# Patient Record
Sex: FEMALE | Race: WHITE | NOT HISPANIC OR LATINO | Employment: FULL TIME | URBAN - METROPOLITAN AREA
[De-identification: names, ages, dates, MRNs, and addresses within clinical notes are randomized per-mention and may not be internally consistent; named-entity substitution may affect disease eponyms.]

---

## 2019-03-07 ENCOUNTER — OFFICE VISIT (OUTPATIENT)
Dept: RHEUMATOLOGY | Facility: CLINIC | Age: 59
End: 2019-03-07
Payer: COMMERCIAL

## 2019-03-07 VITALS
WEIGHT: 293 LBS | BODY MASS INDEX: 45.99 KG/M2 | DIASTOLIC BLOOD PRESSURE: 70 MMHG | HEIGHT: 67 IN | TEMPERATURE: 99 F | HEART RATE: 60 BPM | SYSTOLIC BLOOD PRESSURE: 120 MMHG | RESPIRATION RATE: 16 BRPM

## 2019-03-07 DIAGNOSIS — M54.16 LUMBAR RADICULOPATHY: ICD-10-CM

## 2019-03-07 DIAGNOSIS — M79.7 FIBROMYOSITIS: Primary | ICD-10-CM

## 2019-03-07 DIAGNOSIS — M19.041 PRIMARY OSTEOARTHRITIS OF BOTH HANDS: ICD-10-CM

## 2019-03-07 DIAGNOSIS — M19.042 PRIMARY OSTEOARTHRITIS OF BOTH HANDS: ICD-10-CM

## 2019-03-07 DIAGNOSIS — M70.61 TROCHANTERIC BURSITIS OF RIGHT HIP: ICD-10-CM

## 2019-03-07 DIAGNOSIS — M47.817 SPONDYLOSIS OF LUMBOSACRAL REGION WITHOUT MYELOPATHY OR RADICULOPATHY: ICD-10-CM

## 2019-03-07 DIAGNOSIS — M54.12 CERVICAL RADICULOPATHY: ICD-10-CM

## 2019-03-07 DIAGNOSIS — M75.52 BURSITIS OF LEFT SHOULDER: ICD-10-CM

## 2019-03-07 PROBLEM — M75.50 BURSITIS OF SHOULDER: Status: ACTIVE | Noted: 2019-03-07

## 2019-03-07 PROBLEM — M47.812 CERVICAL SPONDYLOSIS: Status: ACTIVE | Noted: 2019-03-07

## 2019-03-07 PROBLEM — M54.50 CHRONIC LOW BACK PAIN: Status: ACTIVE | Noted: 2019-03-07

## 2019-03-07 PROBLEM — M70.60 TROCHANTERIC BURSITIS: Status: ACTIVE | Noted: 2018-09-07

## 2019-03-07 PROBLEM — M19.049 DEGENERATIVE JOINT DISEASE OF HAND: Status: ACTIVE | Noted: 2019-03-07

## 2019-03-07 PROBLEM — E79.0 HYPERURICEMIA: Status: ACTIVE | Noted: 2019-03-07

## 2019-03-07 PROBLEM — G89.29 CHRONIC LOW BACK PAIN: Status: ACTIVE | Noted: 2019-03-07

## 2019-03-07 PROBLEM — M46.90 SPONDYLITIS: Status: ACTIVE | Noted: 2019-03-07

## 2019-03-07 PROCEDURE — 20610 DRAIN/INJ JOINT/BURSA W/O US: CPT | Mod: LT,S$GLB,, | Performed by: INTERNAL MEDICINE

## 2019-03-07 PROCEDURE — 99214 OFFICE O/P EST MOD 30 MIN: CPT | Mod: 25,S$GLB,, | Performed by: INTERNAL MEDICINE

## 2019-03-07 PROCEDURE — 99214 PR OFFICE/OUTPT VISIT, EST, LEVL IV, 30-39 MIN: ICD-10-PCS | Mod: 25,S$GLB,, | Performed by: INTERNAL MEDICINE

## 2019-03-07 PROCEDURE — 20610 PR DRAIN/INJECT LARGE JOINT/BURSA: ICD-10-PCS | Mod: LT,S$GLB,, | Performed by: INTERNAL MEDICINE

## 2019-03-07 PROCEDURE — 3008F BODY MASS INDEX DOCD: CPT | Mod: CPTII,S$GLB,, | Performed by: INTERNAL MEDICINE

## 2019-03-07 PROCEDURE — 3008F PR BODY MASS INDEX (BMI) DOCUMENTED: ICD-10-PCS | Mod: CPTII,S$GLB,, | Performed by: INTERNAL MEDICINE

## 2019-03-07 RX ORDER — DICLOFENAC SODIUM 10 MG/G
GEL TOPICAL
COMMUNITY

## 2019-03-07 RX ORDER — ESCITALOPRAM OXALATE 20 MG/1
TABLET ORAL
COMMUNITY

## 2019-03-07 RX ORDER — CYCLOBENZAPRINE HCL 10 MG
TABLET ORAL
COMMUNITY
End: 2019-05-07 | Stop reason: SDUPTHER

## 2019-03-07 RX ORDER — TOLTERODINE 4 MG/1
CAPSULE, EXTENDED RELEASE ORAL
COMMUNITY

## 2019-03-07 RX ORDER — TRAMADOL HYDROCHLORIDE AND ACETAMINOPHEN 37.5; 325 MG/1; MG/1
TABLET, FILM COATED ORAL
Refills: 3 | COMMUNITY
Start: 2019-01-28 | End: 2020-03-09 | Stop reason: SDUPTHER

## 2019-03-07 RX ORDER — METHYLPREDNISOLONE ACETATE 40 MG/ML
80 INJECTION, SUSPENSION INTRA-ARTICULAR; INTRALESIONAL; INTRAMUSCULAR; SOFT TISSUE
Status: COMPLETED | OUTPATIENT
Start: 2019-03-07 | End: 2019-03-07

## 2019-03-07 RX ORDER — ESOMEPRAZOLE MAGNESIUM 40 MG/1
CAPSULE, DELAYED RELEASE ORAL
COMMUNITY
End: 2020-01-16

## 2019-03-07 RX ORDER — GABAPENTIN 300 MG/1
CAPSULE ORAL
Refills: 3 | COMMUNITY
Start: 2018-12-03 | End: 2019-10-12 | Stop reason: SDUPTHER

## 2019-03-07 RX ORDER — SALSALATE 750 MG
TABLET ORAL
COMMUNITY
End: 2019-12-01 | Stop reason: SDUPTHER

## 2019-03-07 RX ORDER — METOPROLOL SUCCINATE 50 MG/1
TABLET, EXTENDED RELEASE ORAL
COMMUNITY

## 2019-03-07 RX ORDER — LEVOTHYROXINE SODIUM 200 UG/1
TABLET ORAL
COMMUNITY

## 2019-03-07 RX ORDER — FLUTICASONE PROPIONATE 50 MCG
SPRAY, SUSPENSION (ML) NASAL
COMMUNITY

## 2019-03-07 RX ORDER — TRIAMTERENE/HYDROCHLOROTHIAZID 37.5-25 MG
TABLET ORAL
Refills: 0 | COMMUNITY
Start: 2019-02-12

## 2019-03-07 RX ORDER — ALBUTEROL SULFATE 90 UG/1
AEROSOL, METERED RESPIRATORY (INHALATION)
Refills: 6 | COMMUNITY
Start: 2018-12-19

## 2019-03-07 RX ORDER — GLYBURIDE-METFORMIN HYDROCHLORIDE 5; 500 MG/1; MG/1
TABLET ORAL
Refills: 3 | COMMUNITY
Start: 2018-12-02

## 2019-03-07 RX ORDER — ALPRAZOLAM 0.5 MG/1
TABLET ORAL
Refills: 3 | COMMUNITY
Start: 2019-02-28

## 2019-03-07 RX ORDER — FENOFIBRIC ACID 135 MG/1
CAPSULE, DELAYED RELEASE ORAL
COMMUNITY

## 2019-03-07 RX ORDER — CETIRIZINE HYDROCHLORIDE 10 MG/1
TABLET ORAL
COMMUNITY

## 2019-03-07 RX ADMIN — METHYLPREDNISOLONE ACETATE 80 MG: 40 INJECTION, SUSPENSION INTRA-ARTICULAR; INTRALESIONAL; INTRAMUSCULAR; SOFT TISSUE at 09:03

## 2019-03-07 NOTE — PROGRESS NOTES
Subjective:       Patient ID: Leslie Cruz is a 59 y.o. female.    Chief Complaint: Follow-up    HPIHas lowe3r back pain with sciatica pain  And reques back injection Knees andneck  And finegers hurt but acutely swollen inflammed joint  Has cold with a cough for one week better now        Current medications include:  Current Outpatient Medications on File Prior to Visit   Medication Sig Dispense Refill    ALPRAZolam (XANAX) 0.5 MG tablet TK 1 T PO BID PRN  3    cetirizine (ZYRTEC) 10 MG tablet cetirizine 10 mg tablet   TK 1 T PO ONCE A DAY      cyclobenzaprine (FLEXERIL) 10 MG tablet cyclobenzaprine 10 mg tablet      diclofenac sodium (VOLTAREN) 1 % Gel diclofenac 1 % topical gel      escitalopram oxalate (LEXAPRO) 20 MG tablet escitalopram 20 mg tablet   TK 1 T PO QD      esomeprazole (NEXIUM) 40 MG capsule esomeprazole magnesium 40 mg capsule,delayed release      fenofibric acid (FIBRICOR) 135 mg CpDR fenofibric acid (choline) 135 mg capsule,delayed release      fluticasone (FLONASE) 50 mcg/actuation nasal spray fluticasone propionate 50 mcg/actuation nasal spray,suspension      gabapentin (NEURONTIN) 300 MG capsule TK 2 CS PO BID AFTER MEALS  3    glyBURIDE-metformin 5-500 mg (GLUCOVANCE) 5-500 mg Tab TK 2 TS PO BID  3    levothyroxine (SYNTHROID) 200 MCG tablet levothyroxine 200 mcg tablet   TK 1 T PO  D      metoprolol succinate (TOPROL-XL) 100 MG 24 hr tablet metoprolol succinate  mg tablet,extended release 24 hr   TK 1 T PO  BID      PROAIR HFA 90 mcg/actuation inhaler INL 2 PFS PO Q 4 H PRF SOB  6    salsalate (DISALCID) 750 MG Tab salsalate 750 mg tablet      tolterodine (DETROL LA) 4 MG 24 hr capsule tolterodine ER 4 mg capsule,extended release 24 hr   TK ONE C PO D FOR BLADDER CONTROL      tramadol-acetaminophen 37.5-325 mg (ULTRACET) 37.5-325 mg Tab TK 1 T PO BID PRN  3    triamterene-hydrochlorothiazide 37.5-25 mg (MAXZIDE-25) 37.5-25 mg per tablet TK 1 T PO  D  0     No current  "facility-administered medications on file prior to visit.        Lab Results:  No results found for: WBC, RBC, HGB, HCT, MCV, COLORU, SPECGRAV, PHUR, WBCUR, NITRITE, GLUCOSEUR, KETONESU, BILIRUBINUR, RBCUR, NA, K, CL, CO2, GLU, BUN, CREATININE     Review of Systems   Constitutional: Negative.    HENT:        Dryness in mouth ocasionally   Eyes: Negative.    Respiratory: Negative.    Cardiovascular: Negative.    Gastrointestinal: Positive for diarrhea.   Endocrine:        DM and Hypothyroidism   Genitourinary:        Incontinence   Musculoskeletal: Positive for arthralgias and back pain.   Allergic/Immunologic: Positive for environmental allergies.   Neurological: Positive for numbness.        Numbness in arms and mike in am that resolves after s short while   Hematological: Negative.    Psychiatric/Behavioral: Negative.          Objective:   /70 (BP Location: Left arm, Patient Position: Sitting, BP Method: Large (Manual))   Pulse 60   Temp 98.8 °F (37.1 °C) (Tympanic)   Resp 16   Ht 5' 7" (1.702 m)   Wt (!) 161 kg (355 lb)   BMI 55.60 kg/m²      Physical Exam   Constitutional:   Central obesity   Pulmonary/Chest:   ronchi with cold   Neurological:   SLR=positive bilaterally         Assessment:       1. Fibromyositis    2. Lumbar radiculopathy    3. Spondylosis of lumbosacral region without myelopathy or radiculopathy    4. Cervical radiculopathy    5. Trochanteric bursitis of right hip    6. Primary osteoarthritis of both hands    7. Bursitis of left shoulder            Plan:       Will inject Rt trigger poand continue gabapentinint      "

## 2019-05-08 RX ORDER — CYCLOBENZAPRINE HCL 10 MG
TABLET ORAL
Qty: 60 TABLET | Refills: 2 | Status: SHIPPED | OUTPATIENT
Start: 2019-05-08 | End: 2019-08-18 | Stop reason: SDUPTHER

## 2019-08-19 RX ORDER — CYCLOBENZAPRINE HCL 10 MG
TABLET ORAL
Qty: 60 TABLET | Refills: 3 | Status: SHIPPED | OUTPATIENT
Start: 2019-08-19 | End: 2020-03-10

## 2019-10-14 RX ORDER — GABAPENTIN 300 MG/1
CAPSULE ORAL
Qty: 120 CAPSULE | Refills: 3 | Status: SHIPPED | OUTPATIENT
Start: 2019-10-14 | End: 2020-03-10

## 2019-12-09 ENCOUNTER — OFFICE VISIT (OUTPATIENT)
Dept: RHEUMATOLOGY | Facility: CLINIC | Age: 59
End: 2019-12-09
Payer: COMMERCIAL

## 2019-12-09 VITALS
WEIGHT: 293 LBS | TEMPERATURE: 96 F | OXYGEN SATURATION: 90 % | HEIGHT: 67 IN | DIASTOLIC BLOOD PRESSURE: 80 MMHG | SYSTOLIC BLOOD PRESSURE: 140 MMHG | BODY MASS INDEX: 45.99 KG/M2 | HEART RATE: 71 BPM | RESPIRATION RATE: 16 BRPM

## 2019-12-09 DIAGNOSIS — M54.16 LUMBAR RADICULOPATHY: ICD-10-CM

## 2019-12-09 DIAGNOSIS — M70.61 TROCHANTERIC BURSITIS OF RIGHT HIP: Primary | ICD-10-CM

## 2019-12-09 DIAGNOSIS — M54.41 CHRONIC RIGHT-SIDED LOW BACK PAIN WITH RIGHT-SIDED SCIATICA: ICD-10-CM

## 2019-12-09 DIAGNOSIS — M47.817 SPONDYLOSIS OF LUMBOSACRAL REGION WITHOUT MYELOPATHY OR RADICULOPATHY: ICD-10-CM

## 2019-12-09 DIAGNOSIS — M46.90 INFLAMMATORY SPONDYLOPATHY, UNSPECIFIED SPINAL REGION: ICD-10-CM

## 2019-12-09 DIAGNOSIS — E79.0 HYPERURICEMIA: ICD-10-CM

## 2019-12-09 DIAGNOSIS — G89.29 CHRONIC RIGHT-SIDED LOW BACK PAIN WITH RIGHT-SIDED SCIATICA: ICD-10-CM

## 2019-12-09 PROCEDURE — 3008F PR BODY MASS INDEX (BMI) DOCUMENTED: ICD-10-PCS | Mod: CPTII,S$GLB,, | Performed by: INTERNAL MEDICINE

## 2019-12-09 PROCEDURE — 20552 PR INJECT TRIGGER POINT, 1 OR 2: ICD-10-PCS | Mod: S$GLB,,, | Performed by: INTERNAL MEDICINE

## 2019-12-09 PROCEDURE — 99214 OFFICE O/P EST MOD 30 MIN: CPT | Mod: 25,S$GLB,, | Performed by: INTERNAL MEDICINE

## 2019-12-09 PROCEDURE — 20552 NJX 1/MLT TRIGGER POINT 1/2: CPT | Mod: S$GLB,,, | Performed by: INTERNAL MEDICINE

## 2019-12-09 PROCEDURE — 3008F BODY MASS INDEX DOCD: CPT | Mod: CPTII,S$GLB,, | Performed by: INTERNAL MEDICINE

## 2019-12-09 PROCEDURE — 99214 PR OFFICE/OUTPT VISIT, EST, LEVL IV, 30-39 MIN: ICD-10-PCS | Mod: 25,S$GLB,, | Performed by: INTERNAL MEDICINE

## 2019-12-09 RX ORDER — SALSALATE 750 MG
TABLET ORAL
Qty: 60 TABLET | Refills: 3 | Status: SHIPPED | OUTPATIENT
Start: 2019-12-09 | End: 2020-04-09

## 2019-12-09 RX ORDER — METHYLPREDNISOLONE ACETATE 40 MG/ML
80 INJECTION, SUSPENSION INTRA-ARTICULAR; INTRALESIONAL; INTRAMUSCULAR; SOFT TISSUE
Status: DISCONTINUED | OUTPATIENT
Start: 2019-12-09 | End: 2019-12-09

## 2019-12-09 RX ORDER — LEVOTHYROXINE SODIUM 150 UG/1
75 TABLET ORAL DAILY
COMMUNITY

## 2019-12-09 RX ORDER — INSULIN DEGLUDEC 100 U/ML
1 INJECTION, SOLUTION SUBCUTANEOUS DAILY
COMMUNITY
Start: 2019-12-03

## 2019-12-09 RX ORDER — METHYLPREDNISOLONE ACETATE 40 MG/ML
80 INJECTION, SUSPENSION INTRA-ARTICULAR; INTRALESIONAL; INTRAMUSCULAR; SOFT TISSUE
Status: COMPLETED | OUTPATIENT
Start: 2019-12-09 | End: 2019-12-09

## 2019-12-09 RX ADMIN — METHYLPREDNISOLONE ACETATE 80 MG: 40 INJECTION, SUSPENSION INTRA-ARTICULAR; INTRALESIONAL; INTRAMUSCULAR; SOFT TISSUE at 09:12

## 2019-12-09 NOTE — PROGRESS NOTES
Subjective:       Patient ID: Leslie Cruz is a 59 y.o. female.    Chief Complaint: Pain (pt states that her lower back hurting )    HPI Complaining of lower back for the last 2 week progessiely worse with no injury to lower back.  Associated with rt sciatic pain feeling better when lying down with sitting with supine. On Gavbapentin 300 mg 2 caps BID  With  relief of symptom. She is on Salsalate and nexium with no gi upset.. Uses Voltaren fel topically for the back with no acutely  Swollen painful joint      Current medications include:  Current Outpatient Medications on File Prior to Visit   Medication Sig Dispense Refill    ALPRAZolam (XANAX) 0.5 MG tablet TK 1 T PO BID PRN  3    cetirizine (ZYRTEC) 10 MG tablet cetirizine 10 mg tablet   TK 1 T PO ONCE A DAY      cyclobenzaprine (FLEXERIL) 10 MG tablet TAKE 1 TABLET BY MOUTH TWICE DAILY 60 tablet 3    diclofenac sodium (VOLTAREN) 1 % Gel diclofenac 1 % topical gel      escitalopram oxalate (LEXAPRO) 20 MG tablet escitalopram 20 mg tablet   TK 1 T PO QD      esomeprazole (NEXIUM) 40 MG capsule esomeprazole magnesium 40 mg capsule,delayed release      fenofibric acid (FIBRICOR) 135 mg CpDR fenofibric acid (choline) 135 mg capsule,delayed release      fluticasone (FLONASE) 50 mcg/actuation nasal spray fluticasone propionate 50 mcg/actuation nasal spray,suspension      gabapentin (NEURONTIN) 300 MG capsule TAKE 2 CAPSULES BY MOUTH TWICE DAILY AFTER MEALS 120 capsule 3    glyBURIDE-metformin 5-500 mg (GLUCOVANCE) 5-500 mg Tab TK 2 TS PO BID  3    levothyroxine (SYNTHROID) 200 MCG tablet levothyroxine 200 mcg tablet   TK 1 T PO  D      levothyroxine (SYNTHROID) 75 MCG tablet Take 1 tablet by mouth once daily.      metoprolol succinate (TOPROL-XL) 100 MG 24 hr tablet metoprolol succinate  mg tablet,extended release 24 hr   TK 1 T PO  BID      PROAIR HFA 90 mcg/actuation inhaler INL 2 PFS PO Q 4 H PRF SOB  6    salsalate (DISALCID) 750 MG Tab salsalate  "750 mg tablet      tolterodine (DETROL LA) 4 MG 24 hr capsule tolterodine ER 4 mg capsule,extended release 24 hr   TK ONE C PO D FOR BLADDER CONTROL      tramadol-acetaminophen 37.5-325 mg (ULTRACET) 37.5-325 mg Tab TK 1 T PO BID PRN  3    TRESIBA FLEXTOUCH U-100 100 unit/mL (3 mL) InPn 1 pen by abdominal subcutaneous route once daily.      triamterene-hydrochlorothiazide 37.5-25 mg (MAXZIDE-25) 37.5-25 mg per tablet TK 1 T PO  D  0     No current facility-administered medications on file prior to visit.        Lab Results:  No results found for: WBC, RBC, HGB, HCT, MCV, COLORU, SPECGRAV, PHUR, WBCUR, NITRITE, GLUCOSEUR, KETONESU, BILIRUBINUR, RBCUR, NA, K, CL, CO2, GLU, BUN, CREATININE     Review of Systems   Constitutional: Positive for fatigue.   HENT:        Dryness im mouth and eyes.   Eyes: Negative.    Respiratory: Negative.    Cardiovascular: Negative.    Gastrointestinal: Positive for diarrhea and nausea.   Endocrine:        Hypothyroidism on insulin  And hypothyroidism on T4   Genitourinary:        Incontinent   Musculoskeletal: Positive for arthralgias and back pain.   Allergic/Immunologic: Positive for environmental allergies.   Neurological: Positive for numbness.        Tingling   Hematological: Bruises/bleeds easily.   Psychiatric/Behavioral: Negative.          Objective:   BP (!) 140/80 (BP Location: Left arm, Patient Position: Sitting, BP Method: Large (Manual))   Pulse 71   Temp 96.3 °F (35.7 °C) (Temporal)   Resp 16   Ht 5' 7" (1.702 m)   Wt (!) 164.8 kg (363 lb 6.4 oz)   SpO2 (!) 90%   BMI 56.92 kg/m²      Physical Exam   Constitutional:   obesity   HENT:   Dryness in mouth   Eyes: Conjunctivae and EOM are normal. Pupils are equal, round, and reactive to light.   Neck: Normal range of motion. Neck supple.   Cardiovascular: Normal rate and regular rhythm.    Pulmonary/Chest: Effort normal and breath sounds normal.   Abdominal: Soft.   Neurological:   SLR = positiv   Skin: Skin is warm " and dry.     Psychiatric: Mood, memory, affect and judgment normal.   Musculoskeletal:   Rt shoulder triggeer points, Rt Quadratus lumborus           Assessment:       1. Trochanteric bursitis of right hip    2. Hyperuricemia    3. Chronic right-sided low back pain with right-sided sciatica    4. Inflammatory spondylopathy, unspecified spinal region    5. Spondylosis of lumbosacral region without myelopathy or radiculopathy    6. Lumbar radiculopathy            Plan:       Will inject lower back trigger point

## 2020-01-17 RX ORDER — ESOMEPRAZOLE MAGNESIUM 40 MG/1
CAPSULE, DELAYED RELEASE ORAL
Qty: 30 CAPSULE | Refills: 3 | Status: SHIPPED | OUTPATIENT
Start: 2020-01-17 | End: 2020-06-03

## 2020-03-09 ENCOUNTER — OFFICE VISIT (OUTPATIENT)
Dept: RHEUMATOLOGY | Facility: CLINIC | Age: 60
End: 2020-03-09
Payer: COMMERCIAL

## 2020-03-09 VITALS
DIASTOLIC BLOOD PRESSURE: 64 MMHG | HEIGHT: 67 IN | RESPIRATION RATE: 16 BRPM | HEART RATE: 68 BPM | BODY MASS INDEX: 45.99 KG/M2 | WEIGHT: 293 LBS | SYSTOLIC BLOOD PRESSURE: 101 MMHG | TEMPERATURE: 98 F

## 2020-03-09 DIAGNOSIS — M75.52 BURSITIS OF LEFT SHOULDER: ICD-10-CM

## 2020-03-09 DIAGNOSIS — M75.51 BURSITIS OF DELTOID, RIGHT: Primary | ICD-10-CM

## 2020-03-09 DIAGNOSIS — M46.90 INFLAMMATORY SPONDYLOPATHY, UNSPECIFIED SPINAL REGION: ICD-10-CM

## 2020-03-09 DIAGNOSIS — M79.7 FIBROMYOSITIS: ICD-10-CM

## 2020-03-09 DIAGNOSIS — M47.817 SPONDYLOSIS OF LUMBOSACRAL REGION WITHOUT MYELOPATHY OR RADICULOPATHY: ICD-10-CM

## 2020-03-09 DIAGNOSIS — M54.41 CHRONIC RIGHT-SIDED LOW BACK PAIN WITH RIGHT-SIDED SCIATICA: ICD-10-CM

## 2020-03-09 DIAGNOSIS — M54.12 CERVICAL RADICULOPATHY: ICD-10-CM

## 2020-03-09 DIAGNOSIS — M54.16 LUMBAR RADICULOPATHY: ICD-10-CM

## 2020-03-09 DIAGNOSIS — G89.29 CHRONIC RIGHT-SIDED LOW BACK PAIN WITH RIGHT-SIDED SCIATICA: ICD-10-CM

## 2020-03-09 DIAGNOSIS — M54.12 CERVICAL RADICULOPATHY: Primary | ICD-10-CM

## 2020-03-09 PROCEDURE — 3008F PR BODY MASS INDEX (BMI) DOCUMENTED: ICD-10-PCS | Mod: CPTII,S$GLB,, | Performed by: INTERNAL MEDICINE

## 2020-03-09 PROCEDURE — 20610 LARGE JOINT ASPIRATION/INJECTION: R SUBACROMIAL BURSA: ICD-10-PCS | Mod: RT,S$GLB,, | Performed by: INTERNAL MEDICINE

## 2020-03-09 PROCEDURE — 3008F BODY MASS INDEX DOCD: CPT | Mod: CPTII,S$GLB,, | Performed by: INTERNAL MEDICINE

## 2020-03-09 PROCEDURE — 99214 PR OFFICE/OUTPT VISIT, EST, LEVL IV, 30-39 MIN: ICD-10-PCS | Mod: 25,S$GLB,, | Performed by: INTERNAL MEDICINE

## 2020-03-09 PROCEDURE — 99214 OFFICE O/P EST MOD 30 MIN: CPT | Mod: 25,S$GLB,, | Performed by: INTERNAL MEDICINE

## 2020-03-09 PROCEDURE — 20610 DRAIN/INJ JOINT/BURSA W/O US: CPT | Mod: RT,S$GLB,, | Performed by: INTERNAL MEDICINE

## 2020-03-09 RX ORDER — TRAMADOL HYDROCHLORIDE AND ACETAMINOPHEN 37.5; 325 MG/1; MG/1
TABLET, FILM COATED ORAL
Qty: 90 TABLET | Refills: 3 | Status: SHIPPED | OUTPATIENT
Start: 2020-03-09 | End: 2022-11-04

## 2020-03-09 RX ORDER — METHYLPREDNISOLONE ACETATE 40 MG/ML
80 INJECTION, SUSPENSION INTRA-ARTICULAR; INTRALESIONAL; INTRAMUSCULAR; SOFT TISSUE
Status: COMPLETED | OUTPATIENT
Start: 2020-03-09 | End: 2020-03-09

## 2020-03-09 RX ORDER — DULOXETIN HYDROCHLORIDE 60 MG/1
1 CAPSULE, DELAYED RELEASE ORAL 2 TIMES DAILY
COMMUNITY
Start: 2020-02-16

## 2020-03-09 RX ADMIN — METHYLPREDNISOLONE ACETATE 80 MG: 80 INJECTION, SUSPENSION INTRA-ARTICULAR; INTRALESIONAL; INTRAMUSCULAR; SOFT TISSUE at 08:03

## 2020-03-09 RX ADMIN — METHYLPREDNISOLONE ACETATE 80 MG: 40 INJECTION, SUSPENSION INTRA-ARTICULAR; INTRALESIONAL; INTRAMUSCULAR; SOFT TISSUE at 09:03

## 2020-03-09 NOTE — PROGRESS NOTES
Subjective:       Patient ID: Leslie Cruz is a 60 y.o. female.    Chief Complaint: Follow-up (pt is here for a follow up. c/o Neck has been hurting more than ususal. )    HPI Knee hurt on and  Off  And off  But most symptomatic area is the  shoilder nack. With  Tingling in the arm , Today rt shoulder is most symptomatic and request s depomedrol injection .        Current medications include:  Current Outpatient Medications on File Prior to Visit   Medication Sig Dispense Refill    ALPRAZolam (XANAX) 0.5 MG tablet TK 1 T PO BID PRN  3    cetirizine (ZYRTEC) 10 MG tablet cetirizine 10 mg tablet   TK 1 T PO ONCE A DAY      cyclobenzaprine (FLEXERIL) 10 MG tablet TAKE 1 TABLET BY MOUTH TWICE DAILY 60 tablet 3    diclofenac sodium (VOLTAREN) 1 % Gel diclofenac 1 % topical gel      DULoxetine (CYMBALTA) 60 MG capsule Take 1 capsule by mouth 2 (two) times daily.      esomeprazole (NEXIUM) 40 MG capsule TAKE 1 CAPSULE BY MOUTH EVERY DAY 30 capsule 3    fenofibric acid (FIBRICOR) 135 mg CpDR fenofibric acid (choline) 135 mg capsule,delayed release      fluticasone (FLONASE) 50 mcg/actuation nasal spray fluticasone propionate 50 mcg/actuation nasal spray,suspension      gabapentin (NEURONTIN) 300 MG capsule TAKE 2 CAPSULES BY MOUTH TWICE DAILY AFTER MEALS 120 capsule 3    glyBURIDE-metformin 5-500 mg (GLUCOVANCE) 5-500 mg Tab TK 2 TS PO BID  3    levothyroxine (SYNTHROID) 200 MCG tablet levothyroxine 200 mcg tablet   TK 1 T PO  D      levothyroxine (SYNTHROID) 75 MCG tablet Take 1 tablet by mouth once daily.      metoprolol succinate (TOPROL-XL) 100 MG 24 hr tablet metoprolol succinate  mg tablet,extended release 24 hr   TK 1 T PO  BID      PROAIR HFA 90 mcg/actuation inhaler INL 2 PFS PO Q 4 H PRF SOB  6    salsalate (DISALCID) 750 MG Tab TAKE 1 TABLET BY MOUTH TWICE DAILY 60 tablet 3    tolterodine (DETROL LA) 4 MG 24 hr capsule tolterodine ER 4 mg capsule,extended release 24 hr   TK ONE C PO D FOR  "BLADDER CONTROL      tramadol-acetaminophen 37.5-325 mg (ULTRACET) 37.5-325 mg Tab TK 1 T PO BID PRN  3    TRESIBA FLEXTOUCH U-100 100 unit/mL (3 mL) InPn 1 pen by abdominal subcutaneous route once daily.      triamterene-hydrochlorothiazide 37.5-25 mg (MAXZIDE-25) 37.5-25 mg per tablet TK 1 T PO  D  0    escitalopram oxalate (LEXAPRO) 20 MG tablet escitalopram 20 mg tablet   TK 1 T PO QD       No current facility-administered medications on file prior to visit.        Lab Results:  No results found for: WBC, RBC, HGB, HCT, MCV, COLORU, SPECGRAV, PHUR, WBCUR, NITRITE, GLUCOSEUR, KETONESU, BILIRUBINUR, RBCUR, NA, K, CL, CO2, GLU, BUN, CREATININE     Review of Systems   Constitutional: Negative.    HENT:        Drynessin the rt mouth   Eyes: Negative.    Respiratory: Negative.    Cardiovascular: Negative.    Gastrointestinal: Positive for diarrhea.        Heart burns and Small bacterial overgrowth   Endocrine:        Hypothyroidism   Genitourinary:        Incontinence   Musculoskeletal: Positive for arthralgias and back pain.   Allergic/Immunologic: Positive for environmental allergies.   Neurological:        Tingling in hands   Hematological: Bruises/bleeds easily.   Psychiatric/Behavioral: Negative.          Objective:   /64 (BP Location: Left arm, Patient Position: Sitting, BP Method: Medium (Automatic))   Pulse 68   Temp 97.6 °F (36.4 °C) (Oral)   Resp 16   Ht 5' 7" (1.702 m)   Wt (!) 167.2 kg (368 lb 9.6 oz)   BMI 57.73 kg/m²      Physical Exam   Constitutional: She is oriented to person, place, and time.   obesity   HENT:   Head: Normocephalic and atraumatic.   Dryness in mouth   Eyes: Conjunctivae and EOM are normal. Pupils are equal, round, and reactive to light.   Neck: Normal range of motion. Neck supple.   Cardiovascular: Normal rate, regular rhythm and normal heart sounds.    Pulmonary/Chest: Effort normal and breath sounds normal.   Abdominal: Soft.   Neurological: She is alert and " oriented to person, place, and time. Gait normal.   SLR=negative   Skin: Skin is warm.     Psychiatric: Affect normal.   Musculoskeletal: Normal range of motion.   Rt shoulder tender and POM           Assessment:       1. Inflammatory spondylopathy, unspecified spinal region    2. Chronic right-sided low back pain with right-sided sciatica    3. Bursitis of left shoulder    4. Fibromyositis    5. Cervical radiculopathy    6. Lumbar radiculopathy    7. Spondylosis of lumbosacral region without myelopathy or radiculopathy            Plan:       Will inject Rt shoulder bursitis

## 2020-03-10 RX ORDER — GABAPENTIN 300 MG/1
CAPSULE ORAL
Qty: 120 CAPSULE | Refills: 3 | Status: SHIPPED | OUTPATIENT
Start: 2020-03-10 | End: 2022-11-04

## 2020-03-10 RX ORDER — CYCLOBENZAPRINE HCL 10 MG
TABLET ORAL
Qty: 60 TABLET | Refills: 3 | Status: SHIPPED | OUTPATIENT
Start: 2020-03-10 | End: 2020-07-29 | Stop reason: SDUPTHER

## 2020-03-19 RX ORDER — METHYLPREDNISOLONE ACETATE 80 MG/ML
80 INJECTION, SUSPENSION INTRA-ARTICULAR; INTRALESIONAL; INTRAMUSCULAR; SOFT TISSUE
Status: SHIPPED | OUTPATIENT
Start: 2020-03-09

## 2020-03-19 NOTE — PROCEDURES
Large Joint Aspiration/Injection: R subacromial bursa  Performed by: Tirso Hawk MD  Authorized by: Tirso Hawk MD  Date/Time: 3/9/2020 8:00 AM      Details:   Needle size: 21 G   Approach: lateral  Location:  Shoulder  Site:  R subacromial bursa    Medications: 80 mg methylPREDNISolone acetate 80 mg/mL  Patient tolerance:  patient tolerated the procedure well with no immediate complications

## 2020-04-09 RX ORDER — SALSALATE 750 MG
TABLET ORAL
Qty: 60 TABLET | Refills: 3 | Status: SHIPPED | OUTPATIENT
Start: 2020-04-09 | End: 2022-11-04

## 2020-07-29 DIAGNOSIS — R25.2 SPASM: Primary | ICD-10-CM

## 2020-07-29 RX ORDER — CYCLOBENZAPRINE HCL 10 MG
10 TABLET ORAL 2 TIMES DAILY
Qty: 60 TABLET | Refills: 1 | Status: SHIPPED | OUTPATIENT
Start: 2020-07-29

## 2020-08-10 DIAGNOSIS — M54.17 LUMBOSACRAL RADICULOPATHY: Primary | ICD-10-CM

## 2020-08-11 RX ORDER — TRAMADOL HYDROCHLORIDE 50 MG/1
50 TABLET ORAL 3 TIMES DAILY PRN
Qty: 90 TABLET | Refills: 0 | Status: SHIPPED | OUTPATIENT
Start: 2020-08-11

## 2022-03-04 ENCOUNTER — TELEPHONE (OUTPATIENT)
Dept: GASTROENTEROLOGY | Facility: CLINIC | Age: 62
End: 2022-03-04
Payer: COMMERCIAL

## 2022-03-04 NOTE — TELEPHONE ENCOUNTER
----- Message from Marilynn Elliott sent at 3/3/2022  3:22 PM CST -----  Pt states that fibo has flared up, please call in script....[phone] (home) [phone] (work)    Harpreet lao

## 2022-03-08 ENCOUNTER — TELEPHONE (OUTPATIENT)
Dept: GASTROENTEROLOGY | Facility: CLINIC | Age: 62
End: 2022-03-08
Payer: COMMERCIAL

## 2022-03-08 NOTE — TELEPHONE ENCOUNTER
Spoke to patient. She states having SIBO flare with gas, bloating, diarrhea x 1 week. Xifaxin has helped in the past. Course of xifaxin sent to pharmacy.   MLC

## 2022-03-08 NOTE — TELEPHONE ENCOUNTER
----- Message from Jen Maki sent at 3/8/2022 11:19 AM CST -----  Leslie Cruz is returning a missed call from beata, please give her another call back at [phone]

## 2022-04-20 ENCOUNTER — OFFICE VISIT (OUTPATIENT)
Dept: GASTROENTEROLOGY | Facility: CLINIC | Age: 62
End: 2022-04-20
Payer: COMMERCIAL

## 2022-04-20 VITALS
SYSTOLIC BLOOD PRESSURE: 142 MMHG | DIASTOLIC BLOOD PRESSURE: 78 MMHG | BODY MASS INDEX: 45.99 KG/M2 | HEIGHT: 67 IN | WEIGHT: 293 LBS

## 2022-04-20 DIAGNOSIS — K63.8219 SMALL INTESTINAL BACTERIAL OVERGROWTH (SIBO): Primary | ICD-10-CM

## 2022-04-20 DIAGNOSIS — Z86.010 HISTORY OF COLON POLYPS: ICD-10-CM

## 2022-04-20 PROCEDURE — 3077F SYST BP >= 140 MM HG: CPT | Mod: CPTII,S$GLB,, | Performed by: INTERNAL MEDICINE

## 2022-04-20 PROCEDURE — 3077F PR MOST RECENT SYSTOLIC BLOOD PRESSURE >= 140 MM HG: ICD-10-PCS | Mod: CPTII,S$GLB,, | Performed by: INTERNAL MEDICINE

## 2022-04-20 PROCEDURE — 3078F PR MOST RECENT DIASTOLIC BLOOD PRESSURE < 80 MM HG: ICD-10-PCS | Mod: CPTII,S$GLB,, | Performed by: INTERNAL MEDICINE

## 2022-04-20 PROCEDURE — 4010F ACE/ARB THERAPY RXD/TAKEN: CPT | Mod: CPTII,S$GLB,, | Performed by: INTERNAL MEDICINE

## 2022-04-20 PROCEDURE — 3008F PR BODY MASS INDEX (BMI) DOCUMENTED: ICD-10-PCS | Mod: CPTII,S$GLB,, | Performed by: INTERNAL MEDICINE

## 2022-04-20 PROCEDURE — 1159F PR MEDICATION LIST DOCUMENTED IN MEDICAL RECORD: ICD-10-PCS | Mod: CPTII,S$GLB,, | Performed by: INTERNAL MEDICINE

## 2022-04-20 PROCEDURE — 1160F PR REVIEW ALL MEDS BY PRESCRIBER/CLIN PHARMACIST DOCUMENTED: ICD-10-PCS | Mod: CPTII,S$GLB,, | Performed by: INTERNAL MEDICINE

## 2022-04-20 PROCEDURE — 3008F BODY MASS INDEX DOCD: CPT | Mod: CPTII,S$GLB,, | Performed by: INTERNAL MEDICINE

## 2022-04-20 PROCEDURE — 99213 PR OFFICE/OUTPT VISIT, EST, LEVL III, 20-29 MIN: ICD-10-PCS | Mod: S$GLB,,, | Performed by: INTERNAL MEDICINE

## 2022-04-20 PROCEDURE — 1160F RVW MEDS BY RX/DR IN RCRD: CPT | Mod: CPTII,S$GLB,, | Performed by: INTERNAL MEDICINE

## 2022-04-20 PROCEDURE — 3078F DIAST BP <80 MM HG: CPT | Mod: CPTII,S$GLB,, | Performed by: INTERNAL MEDICINE

## 2022-04-20 PROCEDURE — 99213 OFFICE O/P EST LOW 20 MIN: CPT | Mod: S$GLB,,, | Performed by: INTERNAL MEDICINE

## 2022-04-20 PROCEDURE — 4010F PR ACE/ARB THEARPY RXD/TAKEN: ICD-10-PCS | Mod: CPTII,S$GLB,, | Performed by: INTERNAL MEDICINE

## 2022-04-20 PROCEDURE — 1159F MED LIST DOCD IN RCRD: CPT | Mod: CPTII,S$GLB,, | Performed by: INTERNAL MEDICINE

## 2022-04-20 NOTE — PROGRESS NOTES
Clinic Note    Reason for visit:  The primary encounter diagnosis was Small intestinal bacterial overgrowth (SIBO). Diagnoses of History of colon polyps and BMI 50.0-59.9, adult were also pertinent to this visit.    PCP: Analia Cullen       HPI:  This is a 62 y.o. female who is being seen for a follow up visit. Patient w/ h/o SIBO and had flare up around 3/8/2022 and had gas, bloating, diarrea x 1 week. Xifaxan has helped the patient in the past. Course of Xifaxan sent to pharmacy 3/8/2022 and she states it helped her symptoms. Patient is diabetic and reports having high sugar. She states feeling well now.    Last EGD/colonoscopy 1/5/2018:  STOMACH BX WAS NORMAL, POLYPS OK-REPEAT COLON IN 5 YEARS.    Review of Systems   Constitutional: Negative for chills, diaphoresis, fatigue, fever and unexpected weight change.   HENT: Positive for postnasal drip. Negative for mouth sores, nosebleeds, sore throat, trouble swallowing and voice change.    Eyes: Negative for pain, discharge and eye dryness.   Respiratory: Positive for apnea. Negative for cough, choking, chest tightness, shortness of breath and wheezing.    Cardiovascular: Positive for leg swelling. Negative for chest pain, palpitations and claudication.   Gastrointestinal: Negative for abdominal distention, abdominal pain, anal bleeding, blood in stool, change in bowel habit, constipation, diarrhea, nausea, rectal pain, vomiting, reflux, fecal incontinence and change in bowel habit.   Genitourinary: Positive for bladder incontinence. Negative for difficulty urinating, dysuria, flank pain, frequency and hematuria.   Musculoskeletal: Positive for back pain. Negative for arthralgias, joint swelling and joint deformity.   Integumentary:  Negative for color change, rash and wound.   Allergic/Immunologic: Positive for environmental allergies. Negative for food allergies.   Neurological: Negative for seizures, facial asymmetry, speech difficulty, weakness, headaches and  memory loss.   Hematological: Negative for adenopathy. Does not bruise/bleed easily.   Psychiatric/Behavioral: Positive for sleep disturbance. Negative for agitation, behavioral problems, confusion and hallucinations.      Past Medical History:   Diagnosis Date    Acid reflux     Allergy     Anxiety     Depression     Diabetes mellitus     Dry mouth     Hyperlipidemia     Hypertension     Thyroid disease      Past Surgical History:   Procedure Laterality Date    CHOLECYSTECTOMY      HYSTERECTOMY       Family History   Problem Relation Age of Onset    Hypertension Mother     Cancer Mother     Hypertension Father     Cancer Father     Heart disease Maternal Uncle     Stroke Maternal Uncle     Heart disease Paternal Uncle     Stroke Paternal Uncle     Colon cancer Neg Hx     Colon polyps Neg Hx     Inflammatory bowel disease Neg Hx     Irritable bowel syndrome Neg Hx      Social History     Tobacco Use    Smoking status: Former Smoker     Types: Cigarettes    Smokeless tobacco: Never Used    Tobacco comment: Quit 12 years ago   Substance Use Topics    Alcohol use: Not Currently    Drug use: No     Review of patient's allergies indicates:  No Known Allergies   Medication List with Changes/Refills   Current Medications    ALPRAZOLAM (XANAX) 0.5 MG TABLET    TK 1 T PO BID PRN    CETIRIZINE (ZYRTEC) 10 MG TABLET    cetirizine 10 mg tablet   TK 1 T PO ONCE A DAY    CYCLOBENZAPRINE (FLEXERIL) 10 MG TABLET    Take 1 tablet (10 mg total) by mouth 2 (two) times daily.    DICLOFENAC SODIUM (VOLTAREN) 1 % GEL    diclofenac 1 % topical gel    DULOXETINE (CYMBALTA) 60 MG CAPSULE    Take 1 capsule by mouth 2 (two) times daily.    ESCITALOPRAM OXALATE (LEXAPRO) 20 MG TABLET    escitalopram 20 mg tablet   TK 1 T PO QD    ESOMEPRAZOLE (NEXIUM) 40 MG CAPSULE    TAKE ONE CAPSULE BY MOUTH EVERY DAY    FENOFIBRIC ACID (FIBRICOR) 135 MG CPDR    fenofibric acid (choline) 135 mg capsule,delayed release     "FLUTICASONE (FLONASE) 50 MCG/ACTUATION NASAL SPRAY    fluticasone propionate 50 mcg/actuation nasal spray,suspension    GABAPENTIN (NEURONTIN) 300 MG CAPSULE    TAKE 2 CAPSULES BY MOUTH TWICE DAILY AFTER MEALS    GLYBURIDE-METFORMIN 5-500 MG (GLUCOVANCE) 5-500 MG TAB    TK 2 TS PO BID    LEVOTHYROXINE (SYNTHROID) 200 MCG TABLET    levothyroxine 200 mcg tablet   TK 1 T PO  D    LEVOTHYROXINE (SYNTHROID) 75 MCG TABLET    Take 1 tablet by mouth once daily.    METOPROLOL SUCCINATE (TOPROL-XL) 100 MG 24 HR TABLET    metoprolol succinate  mg tablet,extended release 24 hr   TK 1 T PO  BID    PROAIR HFA 90 MCG/ACTUATION INHALER    INL 2 PFS PO Q 4 H PRF SOB    SALSALATE (DISALCID) 750 MG TAB    TAKE 1 TABLET BY MOUTH TWICE DAILY    TOLTERODINE (DETROL LA) 4 MG 24 HR CAPSULE    tolterodine ER 4 mg capsule,extended release 24 hr   TK ONE C PO D FOR BLADDER CONTROL    TRAMADOL (ULTRAM) 50 MG TABLET    Take 1 tablet (50 mg total) by mouth 3 (three) times daily as needed for Pain. GREATER THAN 7 DAY IS MEDICALLY NECESSARY.    TRAMADOL-ACETAMINOPHEN 37.5-325 MG (ULTRACET) 37.5-325 MG TAB    TK 1 T PO TID PRN. Greater than 7 day supply is medically necessary.    TRESIBA FLEXTOUCH U-100 100 UNIT/ML (3 ML) INPN    1 pen by abdominal subcutaneous route once daily.    TRIAMTERENE-HYDROCHLOROTHIAZIDE 37.5-25 MG (MAXZIDE-25) 37.5-25 MG PER TABLET    TK 1 T PO  D         Vital Signs:  BP (!) 142/78 (BP Location: Left arm, Patient Position: Sitting, BP Method: Medium (Automatic))   Ht 5' 7" (1.702 m)   Wt (!) 157.4 kg (347 lb)   BMI 54.35 kg/m²   Body mass index is 54.35 kg/m².      Physical Exam  Vitals reviewed.   Constitutional:       General: She is awake. She is not in acute distress.     Appearance: Normal appearance. She is well-developed. She is obese. She is not ill-appearing, toxic-appearing or diaphoretic.   HENT:      Head: Normocephalic and atraumatic.      Nose: Nose normal.      Mouth/Throat:      Mouth: Mucous " membranes are moist.      Pharynx: Oropharynx is clear. No oropharyngeal exudate or posterior oropharyngeal erythema.   Eyes:      General: Lids are normal. Gaze aligned appropriately. No scleral icterus.        Right eye: No discharge.         Left eye: No discharge.      Extraocular Movements: Extraocular movements intact.      Conjunctiva/sclera: Conjunctivae normal.   Neck:      Trachea: Trachea normal.   Cardiovascular:      Rate and Rhythm: Normal rate and regular rhythm.      Pulses:           Radial pulses are 2+ on the right side and 2+ on the left side.   Pulmonary:      Effort: Pulmonary effort is normal. No respiratory distress.      Breath sounds: Normal breath sounds. No stridor. No wheezing or rhonchi.   Chest:      Chest wall: No tenderness.   Abdominal:      General: Abdomen is flat. Bowel sounds are normal. There is no distension.      Palpations: Abdomen is soft. There is no fluid wave, hepatomegaly or mass.      Tenderness: There is no abdominal tenderness. There is no guarding or rebound.   Musculoskeletal:         General: No tenderness or deformity.      Cervical back: Full passive range of motion without pain and neck supple. No tenderness.      Right lower leg: No edema.      Left lower leg: No edema.   Lymphadenopathy:      Cervical: No cervical adenopathy.   Skin:     General: Skin is warm and dry.      Capillary Refill: Capillary refill takes less than 2 seconds.      Coloration: Skin is not cyanotic, jaundiced or pale.      Findings: No rash.   Neurological:      General: No focal deficit present.      Mental Status: She is alert and oriented to person, place, and time.      Cranial Nerves: No facial asymmetry.      Motor: No tremor.   Psychiatric:         Attention and Perception: Attention normal.         Mood and Affect: Mood and affect normal.         Speech: Speech normal.         Behavior: Behavior normal. Behavior is cooperative.            All of the data above and below has been  reviewed by myself and any further interpretations will be reflected in the assessment and plan.   The data includes review of external notes, and independent interpretation of lab results, procedures, x-rays, and imaging reports.      Assessment:  Small intestinal bacterial overgrowth (SIBO)    History of colon polyps    BMI 50.0-59.9, adult         Recommendations:  Avoid artificial sweeteners, excess carbs, and high fat foods.  Patient screened for and received weight management and nutritional counseling regarding BMI of less than 18 or greater than 25.  Call if having SIBO flare up, will send out Xifaxan.     Risks, benefits, and alternatives of medical management, any associated procedures, and/or treatment discussed with the patient. Patient given opportunity to ask questions and voices understanding. Patient has elected to proceed with the recommended care modalities as discussed.    Follow up in about 6 months (around 10/20/2022).    Order summary:  No orders of the defined types were placed in this encounter.         John Sanchez MD    This document may have been created using a voice recognition transcribing system. Incorrect words or phrases may have been missed during proofreading. Please interpret accordingly or contact me for clarification.

## 2022-10-24 ENCOUNTER — OFFICE VISIT (OUTPATIENT)
Dept: GASTROENTEROLOGY | Facility: CLINIC | Age: 62
End: 2022-10-24
Payer: COMMERCIAL

## 2022-10-24 VITALS
SYSTOLIC BLOOD PRESSURE: 102 MMHG | HEART RATE: 68 BPM | HEIGHT: 67 IN | OXYGEN SATURATION: 95 % | WEIGHT: 293 LBS | DIASTOLIC BLOOD PRESSURE: 71 MMHG | BODY MASS INDEX: 45.99 KG/M2 | TEMPERATURE: 98 F

## 2022-10-24 DIAGNOSIS — Z86.010 HISTORY OF COLON POLYPS: ICD-10-CM

## 2022-10-24 DIAGNOSIS — Z12.11 SCREENING FOR COLON CANCER: ICD-10-CM

## 2022-10-24 DIAGNOSIS — K63.8219 SMALL INTESTINAL BACTERIAL OVERGROWTH (SIBO): Primary | ICD-10-CM

## 2022-10-24 PROCEDURE — 3078F DIAST BP <80 MM HG: CPT | Mod: CPTII,S$GLB,, | Performed by: INTERNAL MEDICINE

## 2022-10-24 PROCEDURE — 1160F RVW MEDS BY RX/DR IN RCRD: CPT | Mod: CPTII,S$GLB,, | Performed by: INTERNAL MEDICINE

## 2022-10-24 PROCEDURE — 99214 PR OFFICE/OUTPT VISIT, EST, LEVL IV, 30-39 MIN: ICD-10-PCS | Mod: S$GLB,,, | Performed by: INTERNAL MEDICINE

## 2022-10-24 PROCEDURE — 3078F PR MOST RECENT DIASTOLIC BLOOD PRESSURE < 80 MM HG: ICD-10-PCS | Mod: CPTII,S$GLB,, | Performed by: INTERNAL MEDICINE

## 2022-10-24 PROCEDURE — 3074F PR MOST RECENT SYSTOLIC BLOOD PRESSURE < 130 MM HG: ICD-10-PCS | Mod: CPTII,S$GLB,, | Performed by: INTERNAL MEDICINE

## 2022-10-24 PROCEDURE — 1159F PR MEDICATION LIST DOCUMENTED IN MEDICAL RECORD: ICD-10-PCS | Mod: CPTII,S$GLB,, | Performed by: INTERNAL MEDICINE

## 2022-10-24 PROCEDURE — 4010F ACE/ARB THERAPY RXD/TAKEN: CPT | Mod: CPTII,S$GLB,, | Performed by: INTERNAL MEDICINE

## 2022-10-24 PROCEDURE — 1159F MED LIST DOCD IN RCRD: CPT | Mod: CPTII,S$GLB,, | Performed by: INTERNAL MEDICINE

## 2022-10-24 PROCEDURE — 99214 OFFICE O/P EST MOD 30 MIN: CPT | Mod: S$GLB,,, | Performed by: INTERNAL MEDICINE

## 2022-10-24 PROCEDURE — 4010F PR ACE/ARB THEARPY RXD/TAKEN: ICD-10-PCS | Mod: CPTII,S$GLB,, | Performed by: INTERNAL MEDICINE

## 2022-10-24 PROCEDURE — 1160F PR REVIEW ALL MEDS BY PRESCRIBER/CLIN PHARMACIST DOCUMENTED: ICD-10-PCS | Mod: CPTII,S$GLB,, | Performed by: INTERNAL MEDICINE

## 2022-10-24 PROCEDURE — 3074F SYST BP LT 130 MM HG: CPT | Mod: CPTII,S$GLB,, | Performed by: INTERNAL MEDICINE

## 2022-10-24 RX ORDER — PREGABALIN 100 MG/1
100 CAPSULE ORAL 2 TIMES DAILY
COMMUNITY
Start: 2022-10-20

## 2022-10-24 RX ORDER — EMPAGLIFLOZIN, METFORMIN HYDROCHLORIDE 25; 1000 MG/1; MG/1
1 TABLET, EXTENDED RELEASE ORAL DAILY
COMMUNITY
Start: 2022-10-17

## 2022-10-24 RX ORDER — BENAZEPRIL HYDROCHLORIDE 40 MG/1
40 TABLET ORAL DAILY
COMMUNITY
Start: 2022-09-13

## 2022-10-24 RX ORDER — HYDROCORTISONE 25 MG/G
CREAM TOPICAL 2 TIMES DAILY
Qty: 30 G | Refills: 0 | Status: SHIPPED | OUTPATIENT
Start: 2022-10-24 | End: 2022-11-03

## 2022-10-24 RX ORDER — QUETIAPINE FUMARATE 50 MG/1
TABLET, FILM COATED ORAL
COMMUNITY
Start: 2022-09-13

## 2022-10-24 RX ORDER — SOD SULF/POT CHLORIDE/MAG SULF 1.479 G
12 TABLET ORAL DAILY
Qty: 24 TABLET | Refills: 0 | Status: SHIPPED | OUTPATIENT
Start: 2022-10-24

## 2022-10-24 RX ORDER — FENOFIBRATE 160 MG/1
160 TABLET ORAL DAILY
COMMUNITY
Start: 2022-09-30

## 2022-10-24 RX ORDER — SEMAGLUTIDE 1.34 MG/ML
INJECTION, SOLUTION SUBCUTANEOUS
COMMUNITY
Start: 2022-09-19

## 2022-10-24 RX ORDER — NYSTATIN 100000 [USP'U]/G
POWDER TOPICAL
COMMUNITY
Start: 2022-10-07

## 2022-10-24 NOTE — PATIENT INSTRUCTIONS
Schedule colonoscopy. Hold glyburide-metformin the day before procedure.     Please notify my office if you have not been contacted within two weeks after any procedures, submitting any samples (biopsies, blood, stool, urine, etc.) or after any imaging (X-ray, CT, MRI, etc.).

## 2022-10-24 NOTE — LETTER
October 24, 2022        Analia Cullen MD  [address]  [address]  [place] [address]             [facility]  [address] [address]  Phone: [phone]  Fax: [phone]   Patient: Leslie Cruz   MR Number: [id number]   YOB: 1960   Date of Visit: 10/24/2022       Dear Dr. Cullen:    Thank you for referring Leslie Cruz to me for evaluation. Attached you will find relevant portions of my assessment and plan of care.    If you have questions, please do not hesitate to call me. I look forward to following Leslie Cruz along with you.    Sincerely,      John Sanchez MD            CC  No Recipients    Enclosure

## 2022-10-24 NOTE — PROGRESS NOTES
Clinic Note    Reason for visit:  The primary encounter diagnosis was Small intestinal bacterial overgrowth (SIBO). Diagnoses of History of colon polyps, BMI 50.0-59.9, adult, and Screening for colon cancer were also pertinent to this visit.    PCP: Analia Cullen       HPI:  This is a 62 y.o. female who is being seen for a follow up. Patient with h/o SIBO. Xifaxan has helped in the past. She reports doing well. Appetite came back. She reports one episode of BRBPR, that she attributes to hemorrhoids.    Last EGD/colonoscopy 1/5/2018:  STOMACH BX WAS NORMAL, POLYPS OK-REPEAT COLON IN 5 YEARS.    Review of Systems   Constitutional:  Positive for fatigue. Negative for chills, diaphoresis, fever and unexpected weight change.   HENT:  Negative for mouth sores, nosebleeds, postnasal drip, sore throat, trouble swallowing and voice change.    Eyes:  Negative for pain, discharge and eye dryness.   Respiratory:  Positive for apnea. Negative for cough, choking, chest tightness, shortness of breath and wheezing.    Cardiovascular:  Positive for leg swelling. Negative for chest pain, palpitations and claudication.   Gastrointestinal:  Positive for anal bleeding. Negative for abdominal distention, abdominal pain, blood in stool, change in bowel habit, constipation, diarrhea, nausea, rectal pain, vomiting, reflux, fecal incontinence and change in bowel habit.   Genitourinary:  Positive for bladder incontinence. Negative for difficulty urinating, dysuria, flank pain, frequency and hematuria.   Musculoskeletal:  Positive for back pain. Negative for arthralgias, joint swelling and joint deformity.   Integumentary:  Negative for color change, rash and wound.   Allergic/Immunologic: Negative for environmental allergies and food allergies.   Neurological:  Negative for seizures, facial asymmetry, speech difficulty, weakness, headaches and memory loss.   Hematological:  Negative for adenopathy. Does not bruise/bleed easily.    Psychiatric/Behavioral:  Negative for agitation, behavioral problems, confusion, hallucinations and sleep disturbance.       Past Medical History:   Diagnosis Date    Acid reflux     Allergy     Anxiety     Colon polyp     Depression     Diabetes mellitus     Dry mouth     Hyperlipidemia     Hypertension     Thyroid disease      Past Surgical History:   Procedure Laterality Date    CHOLECYSTECTOMY      COLONOSCOPY      HYSTERECTOMY      INCISION AND DRAINAGE OF ABDOMEN N/A     INCISION AND DRAINAGE OF GROIN       Family History   Problem Relation Age of Onset    Hypertension Mother     Cancer Mother     Hypertension Father     Cancer Father     Breast cancer Sister     Heart disease Maternal Uncle     Stroke Maternal Uncle     Heart disease Paternal Uncle     Stroke Paternal Uncle     Colon cancer Neg Hx     Colon polyps Neg Hx     Inflammatory bowel disease Neg Hx     Irritable bowel syndrome Neg Hx      Social History     Tobacco Use    Smoking status: Former     Types: Cigarettes    Smokeless tobacco: Never    Tobacco comments:     Quit 12 years ago   Substance Use Topics    Alcohol use: Yes     Comment: rarely    Drug use: No     Review of patient's allergies indicates:  No Known Allergies     Medication List with Changes/Refills   New Medications    HYDROCORTISONE 2.5 % CREAM    Apply topically 2 (two) times daily. Apply per rectum for 10 days    SOD SULF-POT CHLORIDE-MAG SULF (SUTAB) 1.479-0.188- 0.225 GRAM TABLET    Take 12 tablets by mouth once daily. Take according to package instructions with indicated amount of water. No breakfast day before test. May substitute with Suprep, Clenpiq, Plenvu, Moviprep or GoLytely based on Rx plan and patient preference.   Current Medications    ALPRAZOLAM (XANAX) 0.5 MG TABLET    TK 1 T PO BID PRN    BENAZEPRIL (LOTENSIN) 40 MG TABLET    Take 40 mg by mouth once daily.    CETIRIZINE (ZYRTEC) 10 MG TABLET    cetirizine 10 mg tablet   TK 1 T PO ONCE A DAY     CYCLOBENZAPRINE (FLEXERIL) 10 MG TABLET    Take 1 tablet (10 mg total) by mouth 2 (two) times daily.    DICLOFENAC SODIUM (VOLTAREN) 1 % GEL    diclofenac 1 % topical gel    DULOXETINE (CYMBALTA) 60 MG CAPSULE    Take 1 capsule by mouth 2 (two) times daily.    ESCITALOPRAM OXALATE (LEXAPRO) 20 MG TABLET    escitalopram 20 mg tablet   TK 1 T PO QD    ESOMEPRAZOLE (NEXIUM) 40 MG CAPSULE    TAKE ONE CAPSULE BY MOUTH EVERY DAY    FENOFIBRATE 160 MG TAB    Take 160 mg by mouth once daily.    FENOFIBRIC ACID (FIBRICOR) 135 MG CPDR    fenofibric acid (choline) 135 mg capsule,delayed release    FLUTICASONE (FLONASE) 50 MCG/ACTUATION NASAL SPRAY    fluticasone propionate 50 mcg/actuation nasal spray,suspension    GABAPENTIN (NEURONTIN) 300 MG CAPSULE    TAKE 2 CAPSULES BY MOUTH TWICE DAILY AFTER MEALS    GLYBURIDE-METFORMIN 5-500 MG (GLUCOVANCE) 5-500 MG TAB    TK 2 TS PO BID    LEVOTHYROXINE (SYNTHROID) 150 MCG TABLET    Take 75 mcg by mouth once daily.    LEVOTHYROXINE (SYNTHROID) 200 MCG TABLET    levothyroxine 200 mcg tablet   TK 1 T PO  D    METOPROLOL SUCCINATE (TOPROL-XL) 50 MG 24 HR TABLET    metoprolol succinate  mg tablet,extended release 24 hr   TK 1 T PO  BID    NYSTOP POWDER    APPLY LIBERALLY TO SKIN THREE TIMES DAILY AS NEEDED    OZEMPIC 0.25 MG OR 0.5 MG(2 MG/1.5 ML) PEN INJECTOR    INJECT 0.5 MG SUBCUTANEOUS EVERY WEEK    PREGABALIN (LYRICA) 100 MG CAPSULE    Take 100 mg by mouth 2 (two) times daily.    PROAIR HFA 90 MCG/ACTUATION INHALER    INL 2 PFS PO Q 4 H PRF SOB    QUETIAPINE (SEROQUEL) 50 MG TABLET    TAKE 1 TO 2 TABLETS BY MOUTH AT BEDTIME AS NEEDED FOR SLEEP    SALSALATE (DISALCID) 750 MG TAB    TAKE 1 TABLET BY MOUTH TWICE DAILY    SYNJARDY XR 25-1,000 MG TBPH    Take 1 tablet by mouth once daily.    TOLTERODINE (DETROL LA) 4 MG 24 HR CAPSULE    tolterodine ER 4 mg capsule,extended release 24 hr   TK ONE C PO D FOR BLADDER CONTROL    TRAMADOL (ULTRAM) 50 MG TABLET    Take 1 tablet (50 mg  "total) by mouth 3 (three) times daily as needed for Pain. GREATER THAN 7 DAY IS MEDICALLY NECESSARY.    TRAMADOL-ACETAMINOPHEN 37.5-325 MG (ULTRACET) 37.5-325 MG TAB    TK 1 T PO TID PRN. Greater than 7 day supply is medically necessary.    TRESIBA FLEXTOUCH U-100 100 UNIT/ML (3 ML) INPN    1 pen by abdominal subcutaneous route once daily.    TRIAMTERENE-HYDROCHLOROTHIAZIDE 37.5-25 MG (MAXZIDE-25) 37.5-25 MG PER TABLET    TK 1 T PO  D         Vital Signs:  /71   Pulse 68   Temp 98.2 °F (36.8 °C)   Ht 5' 7" (1.702 m)   Wt (!) 158.9 kg (350 lb 6.4 oz)   SpO2 95%   BMI 54.88 kg/m²         Physical Exam  Vitals reviewed.   Constitutional:       General: She is awake. She is not in acute distress.     Appearance: Normal appearance. She is well-developed. She is obese. She is not ill-appearing, toxic-appearing or diaphoretic.   HENT:      Head: Normocephalic and atraumatic.      Nose: Nose normal.      Mouth/Throat:      Mouth: Mucous membranes are moist.      Pharynx: Oropharynx is clear. No oropharyngeal exudate or posterior oropharyngeal erythema.   Eyes:      General: Lids are normal. Gaze aligned appropriately. No scleral icterus.        Right eye: No discharge.         Left eye: No discharge.      Extraocular Movements: Extraocular movements intact.      Conjunctiva/sclera: Conjunctivae normal.   Neck:      Trachea: Trachea normal.   Cardiovascular:      Rate and Rhythm: Normal rate and regular rhythm.      Pulses:           Radial pulses are 2+ on the right side and 2+ on the left side.   Pulmonary:      Effort: Pulmonary effort is normal. No respiratory distress.      Breath sounds: Normal breath sounds. No stridor. No wheezing or rhonchi.   Chest:      Chest wall: No tenderness.   Abdominal:      General: Bowel sounds are normal. There is no distension.      Palpations: Abdomen is soft. There is no fluid wave, hepatomegaly or mass.      Tenderness: There is no abdominal tenderness. There is no " guarding or rebound.   Musculoskeletal:         General: No tenderness or deformity.      Cervical back: Full passive range of motion without pain and neck supple. No tenderness.      Right lower leg: No edema.      Left lower leg: No edema.   Lymphadenopathy:      Cervical: No cervical adenopathy.   Skin:     General: Skin is warm and dry.      Capillary Refill: Capillary refill takes less than 2 seconds.      Coloration: Skin is not cyanotic, jaundiced or pale.      Findings: No rash.   Neurological:      General: No focal deficit present.      Mental Status: She is alert and oriented to person, place, and time.      Cranial Nerves: No facial asymmetry.      Motor: No tremor.   Psychiatric:         Attention and Perception: Attention normal.         Mood and Affect: Mood and affect normal.         Speech: Speech normal.         Behavior: Behavior normal. Behavior is cooperative.          All of the data above and below has been reviewed by myself and any further interpretations will be reflected in the assessment and plan.   The data includes review of external notes, and independent interpretation of lab results, procedures, x-rays, and imaging reports.      Assessment:  Small intestinal bacterial overgrowth (SIBO)    History of colon polyps  -     Ambulatory Referral to External Surgery    BMI 50.0-59.9, adult    Screening for colon cancer  -     Ambulatory Referral to External Surgery    Other orders  -     hydrocortisone 2.5 % cream; Apply topically 2 (two) times daily. Apply per rectum for 10 days  Dispense: 30 g; Refill: 0  -     sod sulf-pot chloride-mag sulf (SUTAB) 1.479-0.188- 0.225 gram tablet; Take 12 tablets by mouth once daily. Take according to package instructions with indicated amount of water. No breakfast day before test. May substitute with Suprep, Clenpiq, Plenvu, Moviprep or GoLytely based on Rx plan and patient preference.  Dispense: 24 tablet; Refill: 0       Recommendations:  Schedule  colonoscopy at [facility] with sutab after 1/5/2023. Hold glyburide-metformin the day before procedure.   Patient screened for and received weight management and nutritional counseling regarding BMI of less than 18 or greater than 25.      Risks, benefits, and alternatives of medical management, any associated procedures, and/or treatment discussed with the patient. Patient given opportunity to ask questions and voices understanding. Patient has elected to proceed with the recommended care modalities as discussed.    Follow up in about 6 months (around 4/24/2023).    Order summary:  Orders Placed This Encounter   Procedures    Ambulatory Referral to External Surgery          Instructed patient to notify my office if they have not been contacted within two weeks after any procedures, submitting any samples (biopsies, blood, stool, urine, etc.) or after any imaging (X-ray, CT, MRI, etc.).     John Sanchez MD    This document may have been created using a voice recognition transcribing system. Incorrect words or phrases may have been missed during proofreading. Please interpret accordingly or contact me for clarification.

## 2022-11-04 ENCOUNTER — TELEPHONE (OUTPATIENT)
Dept: GASTROENTEROLOGY | Facility: CLINIC | Age: 62
End: 2022-11-04
Payer: COMMERCIAL

## 2022-11-04 DIAGNOSIS — K58.0 IRRITABLE BOWEL SYNDROME WITH DIARRHEA: Primary | ICD-10-CM

## 2022-11-04 NOTE — TELEPHONE ENCOUNTER
Patient requesting a refill on antibiotics to clear up sibo and something for the diarrhea that goes with it.

## 2022-12-15 ENCOUNTER — TELEPHONE (OUTPATIENT)
Dept: GASTROENTEROLOGY | Facility: CLINIC | Age: 62
End: 2022-12-15
Payer: COMMERCIAL

## 2022-12-15 NOTE — TELEPHONE ENCOUNTER
----- Message from Robyn Garza sent at 12/15/2022 12:56 PM CST -----  Regarding: Medication  Contact: patient  Per phone call with patient, she is stating she is wanting medication called out for Sibo due to hurting and being uncomfortable with her bowels. Please return call [phone]. Would like the medication to pharmacy at. Patient wants medication for diarrhea to be called out.   [id number] [address]  [address] [place] [id number]  Phone: [phone] Fax: [phone]    Thanks,   SJ

## 2022-12-15 NOTE — TELEPHONE ENCOUNTER
Patient states she is having symptoms of Sibo with severe diarrhea, abdominal cramping, and nausea. She states she really feels bad. She is requesting antibiotics and something to stop the diarrhea.  Please advise.

## 2022-12-19 NOTE — TELEPHONE ENCOUNTER
Notified patient that xifaxin was called in to pharmacy, and to call back with any further problems as needed.

## 2023-01-05 ENCOUNTER — TELEPHONE (OUTPATIENT)
Dept: GASTROENTEROLOGY | Facility: CLINIC | Age: 63
End: 2023-01-05
Payer: COMMERCIAL

## 2023-01-05 VITALS — BODY MASS INDEX: 45.99 KG/M2 | HEIGHT: 67 IN | WEIGHT: 293 LBS

## 2023-01-05 NOTE — TELEPHONE ENCOUNTER
"[facility]  [address] [place] [address]  Phone: [phone]  Fax: [phone]    History & Physical         Provider: Dr. John Sanchez    Patient Name: Leslie PRESCOTT (age):1960  62 y.o.           Gender: female   Phone: [phone]     Referring Physician: Analia Cullen     Vital Signs:   Height - 5' 7"  Weight - 350 lbs  BMI -  54.8    Plan: Colonoscopy @ [facility]    Z86.010 - History of colon polyps  Z12.11 - Screening for colon cancer       History:      Past Medical History:   Diagnosis Date    Acid reflux     Allergy     Anxiety     Colon polyp     Depression     Diabetes mellitus     Dry mouth     Hyperlipidemia     Hypertension     Thyroid disease       Past Surgical History:   Procedure Laterality Date    CHOLECYSTECTOMY      COLONOSCOPY      HYSTERECTOMY      INCISION AND DRAINAGE OF ABDOMEN N/A     INCISION AND DRAINAGE OF GROIN        Medication List with Changes/Refills   Current Medications    ALPRAZOLAM (XANAX) 0.5 MG TABLET    TK 1 T PO BID PRN    BENAZEPRIL (LOTENSIN) 40 MG TABLET    Take 40 mg by mouth once daily.    CETIRIZINE (ZYRTEC) 10 MG TABLET    cetirizine 10 mg tablet   TK 1 T PO ONCE A DAY    CYCLOBENZAPRINE (FLEXERIL) 10 MG TABLET    Take 1 tablet (10 mg total) by mouth 2 (two) times daily.    DICLOFENAC SODIUM (VOLTAREN) 1 % GEL    diclofenac 1 % topical gel    DULOXETINE (CYMBALTA) 60 MG CAPSULE    Take 1 capsule by mouth 2 (two) times daily.    ESCITALOPRAM OXALATE (LEXAPRO) 20 MG TABLET    escitalopram 20 mg tablet   TK 1 T PO QD    ESOMEPRAZOLE (NEXIUM) 40 MG CAPSULE    TAKE ONE CAPSULE BY MOUTH EVERY DAY    FENOFIBRATE 160 MG TAB    Take 160 mg by mouth once daily.    FENOFIBRIC ACID (FIBRICOR) 135 MG CPDR    fenofibric acid (choline) 135 mg capsule,delayed release    FLUTICASONE (FLONASE) 50 MCG/ACTUATION NASAL SPRAY    fluticasone propionate 50 mcg/actuation " nasal spray,suspension    GLYBURIDE-METFORMIN 5-500 MG (GLUCOVANCE) 5-500 MG TAB    TK 2 TS PO BID    HYDROCORTISONE 2.5 % CREAM    Apply topically 2 (two) times daily. Apply per rectum for 10 days    LEVOTHYROXINE (SYNTHROID) 150 MCG TABLET    Take 75 mcg by mouth once daily.    LEVOTHYROXINE (SYNTHROID) 200 MCG TABLET    levothyroxine 200 mcg tablet   TK 1 T PO  D    METOPROLOL SUCCINATE (TOPROL-XL) 50 MG 24 HR TABLET    metoprolol succinate  mg tablet,extended release 24 hr   TK 1 T PO  BID    NYSTOP POWDER    APPLY LIBERALLY TO SKIN THREE TIMES DAILY AS NEEDED    OZEMPIC 0.25 MG OR 0.5 MG(2 MG/1.5 ML) PEN INJECTOR    INJECT 0.5 MG SUBCUTANEOUS EVERY WEEK    PREGABALIN (LYRICA) 100 MG CAPSULE    Take 100 mg by mouth 2 (two) times daily.    PROAIR HFA 90 MCG/ACTUATION INHALER    INL 2 PFS PO Q 4 H PRF SOB    QUETIAPINE (SEROQUEL) 50 MG TABLET    TAKE 1 TO 2 TABLETS BY MOUTH AT BEDTIME AS NEEDED FOR SLEEP    SOD SULF-POT CHLORIDE-MAG SULF (SUTAB) 1.479-0.188- 0.225 GRAM TABLET    Take 12 tablets by mouth once daily. Take according to package instructions with indicated amount of water. No breakfast day before test. May substitute with Suprep, Clenpiq, Plenvu, Moviprep or GoLytely based on Rx plan and patient preference.    SYNJARDY XR 25-1,000 MG TBPH    Take 1 tablet by mouth once daily.    TOLTERODINE (DETROL LA) 4 MG 24 HR CAPSULE    tolterodine ER 4 mg capsule,extended release 24 hr   TK ONE C PO D FOR BLADDER CONTROL    TRAMADOL (ULTRAM) 50 MG TABLET    Take 1 tablet (50 mg total) by mouth 3 (three) times daily as needed for Pain. GREATER THAN 7 DAY IS MEDICALLY NECESSARY.    TRESIBA FLEXTOUCH U-100 100 UNIT/ML (3 ML) INPN    1 pen by abdominal subcutaneous route once daily.    TRIAMTERENE-HYDROCHLOROTHIAZIDE 37.5-25 MG (MAXZIDE-25) 37.5-25 MG PER TABLET    TK 1 T PO  D      Review of patient's allergies indicates:  No Known Allergies   Family History   Problem Relation Age of Onset    Hypertension  Mother     Cancer Mother     Hypertension Father     Cancer Father     Breast cancer Sister     Heart disease Maternal Uncle     Stroke Maternal Uncle     Heart disease Paternal Uncle     Stroke Paternal Uncle     Colon cancer Neg Hx     Colon polyps Neg Hx     Inflammatory bowel disease Neg Hx     Irritable bowel syndrome Neg Hx       Social History     Tobacco Use    Smoking status: Former     Types: Cigarettes    Smokeless tobacco: Never    Tobacco comments:     Quit 12 years ago   Substance Use Topics    Alcohol use: Yes     Comment: rarely    Drug use: No        Physical Examination:     General Appearance:___________________________  HEENT: _____________________________________  Abdomen:____________________________________  Heart:________________________________________  Lungs:_______________________________________  Extremities:___________________________________  Skin:_________________________________________  Endocrine:____________________________________  Genitourinary:_________________________________  Neurological:__________________________________      Patient has been evaluated immediately prior to sedation and is medically cleared for endoscopy with IVCS as an ASA class: ______      Physician Signature: _________________________       Date: ________  Time: ________

## 2023-01-12 ENCOUNTER — OUTSIDE PLACE OF SERVICE (OUTPATIENT)
Dept: GASTROENTEROLOGY | Facility: CLINIC | Age: 63
End: 2023-01-12

## 2023-01-12 LAB — CRC RECOMMENDATION EXT: NORMAL

## 2023-01-12 PROCEDURE — 45380 PR COLONOSCOPY,BIOPSY: ICD-10-PCS | Mod: 59,,, | Performed by: INTERNAL MEDICINE

## 2023-01-12 PROCEDURE — 45380 COLONOSCOPY AND BIOPSY: CPT | Mod: 59,,, | Performed by: INTERNAL MEDICINE

## 2023-01-12 PROCEDURE — 45385 COLONOSCOPY W/LESION REMOVAL: CPT | Mod: 33,,, | Performed by: INTERNAL MEDICINE

## 2023-01-12 PROCEDURE — 45385 PR COLONOSCOPY,REMV LESN,SNARE: ICD-10-PCS | Mod: 33,,, | Performed by: INTERNAL MEDICINE

## 2023-01-13 LAB — SPECIMEN TO PATHOLOGY: NORMAL

## 2023-01-17 NOTE — PROGRESS NOTES
Call with results,polyp ok-repeat colon in 5 yrs, bx's of colon were NORMAL-does she still have diarrhea?  Would continue the bone broth as the medication I would give inhibits absorption of meds, vitamins, etc

## 2023-01-20 ENCOUNTER — TELEPHONE (OUTPATIENT)
Dept: GASTROENTEROLOGY | Facility: CLINIC | Age: 63
End: 2023-01-20
Payer: COMMERCIAL

## 2023-01-20 NOTE — TELEPHONE ENCOUNTER
----- Message from Lilibeth Oliva LPN sent at 1/17/2023  2:05 PM CST -----    ----- Message -----  From: John Sanchez MD  Sent: 1/17/2023  12:58 PM CST  To: Lilibeth Oliva LPN    Call with results,polyp ok-repeat colon in 5 yrs, bx's of colon were NORMAL-does she still have diarrhea?  Would continue the bone broth as the medication I would give inhibits absorption of meds, vitamins, etc

## 2023-02-03 ENCOUNTER — DOCUMENTATION ONLY (OUTPATIENT)
Dept: GASTROENTEROLOGY | Facility: CLINIC | Age: 63
End: 2023-02-03
Payer: COMMERCIAL